# Patient Record
Sex: FEMALE | Race: OTHER | HISPANIC OR LATINO | URBAN - METROPOLITAN AREA
[De-identification: names, ages, dates, MRNs, and addresses within clinical notes are randomized per-mention and may not be internally consistent; named-entity substitution may affect disease eponyms.]

---

## 2022-07-09 ENCOUNTER — TELEPHONE ENCOUNTER (OUTPATIENT)
Dept: URBAN - METROPOLITAN AREA CLINIC 121 | Facility: CLINIC | Age: 42
End: 2022-07-09

## 2022-07-10 ENCOUNTER — TELEPHONE ENCOUNTER (OUTPATIENT)
Dept: URBAN - METROPOLITAN AREA CLINIC 121 | Facility: CLINIC | Age: 42
End: 2022-07-10

## 2022-07-12 ENCOUNTER — OFFICE VISIT (OUTPATIENT)
Dept: URBAN - METROPOLITAN AREA TELEHEALTH 1 | Facility: TELEHEALTH | Age: 42
End: 2022-07-12

## 2022-08-02 ENCOUNTER — OFFICE VISIT (OUTPATIENT)
Dept: URBAN - METROPOLITAN AREA TELEHEALTH 1 | Facility: TELEHEALTH | Age: 42
End: 2022-08-02
Payer: COMMERCIAL

## 2022-08-02 DIAGNOSIS — K29.60 REFLUX GASTRITIS: ICD-10-CM

## 2022-08-02 DIAGNOSIS — R10.13 DYSPEPSIA: ICD-10-CM

## 2022-08-02 PROCEDURE — 99213 OFFICE O/P EST LOW 20 MIN: CPT | Performed by: INTERNAL MEDICINE

## 2022-08-02 RX ORDER — SUCRALFATE 1 G/1
1 TABLET ON AN EMPTY STOMACH TABLET ORAL TWICE A DAY
Qty: 60 | Refills: 0 | OUTPATIENT
Start: 2022-08-02 | End: 2022-09-01

## 2022-08-02 NOTE — HPI-TODAY'S VISIT:
Patient seen today via telehealth by agreement and consent of patient in light of current COVID-19 pandemic. I used video conferencing during the visit. The patient encounter is appropriate and reasonable under the circumstances given the patient's particular presentation at this time. The patient has been advised of the followin) the potential risks and limitations of this mode of treatment (including but not limited to the absence of in-person examination); 2) the right to refuse telehealth services at any point without affecting the right to future care; 3) the right to receive in-person services, included immediately after this consultation if an urgent need arises; 4) information, including identifiable images or information from this telehealth consult, will only be shared in accordance with HIPPA regulations. Any and all of the patient's and/or patient's family member's questions on this issue have been answered. The patient has verbally consented to be treated via telehealth services. The patient has also been advised to contact this office for worsening conditions or problems, and seek emergency medical treatment and/or call 911 if the patient deems either necessary.   More than half of the face-to-face time used for counseling and coordination of care.  The patient received telehealth services at: home   Patient with lower abdominal pain, intermittent, 9/10 intensity, that started 2 years ago, she said had not gynecologic work up done. history of constipation, recently with normal daily  BM. No Hx of CRC.  Will do colonoscopy and abdominal US.   Colonoscopy with evidence of one tubular adenoma, abdominal US with evidence of multiple uterine fibroid. Patient said she is doing well, no more constipation no GI complaints.  :  Patient is seen for a surveillance colonoscopy. Patient last colonoscopy done in  with personal h/o colonic polyp ( adenoma)  Notes 1-2 daily bowel movements without constipation or diarrhea. Denies nausea, vomiting, heartburn, reflux, dysphagia, odynophagia, hematemesis, coffee ground emesis, abdominal pain, change in bowel habits, abnormal weight loss, BRBPR or melena. Denies family history of colon cancer or colon polyps. No other GI complaints at this time. Laz plan surveillance colonoscopy. Patiient  with occasional diarrhea and irritation of the hemorrhoids, Patient had tubal ligation on December of last year. Patient with reflux and bloating. Will plan EGD , patient taking omeprazole daily with no improvement will start sucralfate twice a day.

## 2022-08-09 ENCOUNTER — LAB OUTSIDE AN ENCOUNTER (OUTPATIENT)
Dept: URBAN - METROPOLITAN AREA TELEHEALTH 1 | Facility: TELEHEALTH | Age: 42
End: 2022-08-09

## 2022-09-02 ENCOUNTER — TELEPHONE ENCOUNTER (OUTPATIENT)
Dept: URBAN - METROPOLITAN AREA CLINIC 64 | Facility: CLINIC | Age: 42
End: 2022-09-02

## 2022-09-02 RX ORDER — LIDOCAINE 50 MG/G
1 APPLICATION AS NEEDED CREAM TOPICAL
OUTPATIENT
Start: 2022-09-02

## 2022-09-09 ENCOUNTER — LAB OUTSIDE AN ENCOUNTER (OUTPATIENT)
Dept: URBAN - METROPOLITAN AREA CLINIC 64 | Facility: CLINIC | Age: 42
End: 2022-09-09

## 2022-09-09 ENCOUNTER — OFFICE VISIT (OUTPATIENT)
Dept: URBAN - METROPOLITAN AREA SURGERY CENTER 4 | Facility: SURGERY CENTER | Age: 42
End: 2022-09-09
Payer: COMMERCIAL

## 2022-09-09 ENCOUNTER — CLAIMS CREATED FROM THE CLAIM WINDOW (OUTPATIENT)
Dept: URBAN - METROPOLITAN AREA CLINIC 4 | Facility: CLINIC | Age: 42
End: 2022-09-09
Payer: COMMERCIAL

## 2022-09-09 DIAGNOSIS — K31.89 OTHER DISEASES OF STOMACH AND DUODENUM: ICD-10-CM

## 2022-09-09 DIAGNOSIS — B96.81 HELICOBACTER PYLORI [H. PYLORI] AS THE CAUSE OF DISEASES CLASSIFIED ELSEWHERE: ICD-10-CM

## 2022-09-09 DIAGNOSIS — K29.60 OTHER GASTRITIS WITHOUT BLEEDING: ICD-10-CM

## 2022-09-09 DIAGNOSIS — Z86.010 HX OF ADENOMATOUS COLONIC POLYPS: ICD-10-CM

## 2022-09-09 DIAGNOSIS — K57.30 DIVERTCULOSIS OF LG INT W/O PERFORATION OR ABSCESS W/O BLEEDING: ICD-10-CM

## 2022-09-09 DIAGNOSIS — K21.9 GASTRO-ESOPHAGEAL REFLUX DISEASE WITHOUT ESOPHAGITIS: ICD-10-CM

## 2022-09-09 DIAGNOSIS — R19.8 OTHER SPECIFIED SYMPTOMS AND SIGNS INVOLVING THE DIGESTIVE SYSTEM AND ABDOMEN: ICD-10-CM

## 2022-09-09 DIAGNOSIS — K29.50 UNSPECIFIED CHRONIC GASTRITIS WITHOUT BLEEDING: ICD-10-CM

## 2022-09-09 DIAGNOSIS — K64.1 GRADE II INTERNAL HEMORRHOIDS: ICD-10-CM

## 2022-09-09 PROCEDURE — 88342 IMHCHEM/IMCYTCHM 1ST ANTB: CPT | Performed by: PATHOLOGY

## 2022-09-09 PROCEDURE — G8907 PT DOC NO EVENTS ON DISCHARG: HCPCS | Performed by: INTERNAL MEDICINE

## 2022-09-09 PROCEDURE — G0105 COLORECTAL SCRN; HI RISK IND: HCPCS | Performed by: INTERNAL MEDICINE

## 2022-09-09 PROCEDURE — 88312 SPECIAL STAINS GROUP 1: CPT | Performed by: PATHOLOGY

## 2022-09-09 PROCEDURE — 88305 TISSUE EXAM BY PATHOLOGIST: CPT | Performed by: PATHOLOGY

## 2022-09-09 PROCEDURE — 43239 EGD BIOPSY SINGLE/MULTIPLE: CPT | Performed by: INTERNAL MEDICINE

## 2022-09-09 PROCEDURE — G8918 PT W/O PREOP ORDER IV AB PRO: HCPCS | Performed by: INTERNAL MEDICINE

## 2022-09-09 RX ORDER — LIDOCAINE 50 MG/G
1 APPLICATION AS NEEDED CREAM TOPICAL
Status: ACTIVE | COMMUNITY
Start: 2022-09-02

## 2022-09-15 PROBLEM — 398050005 DIVERTICULAR DISEASE OF COLON: Status: ACTIVE | Noted: 2022-09-15

## 2022-09-15 PROBLEM — 84568007 ATROPHIC GASTRITIS: Status: ACTIVE | Noted: 2022-09-15

## 2022-09-20 ENCOUNTER — TELEPHONE ENCOUNTER (OUTPATIENT)
Dept: URBAN - METROPOLITAN AREA CLINIC 63 | Facility: CLINIC | Age: 42
End: 2022-09-20

## 2022-09-27 ENCOUNTER — OFFICE VISIT (OUTPATIENT)
Dept: URBAN - METROPOLITAN AREA CLINIC 63 | Facility: CLINIC | Age: 42
End: 2022-09-27

## 2022-09-27 ENCOUNTER — TELEPHONE ENCOUNTER (OUTPATIENT)
Dept: URBAN - METROPOLITAN AREA CLINIC 63 | Facility: CLINIC | Age: 42
End: 2022-09-27

## 2022-09-27 RX ORDER — LIDOCAINE 50 MG/G
1 APPLICATION AS NEEDED CREAM TOPICAL
Status: ACTIVE | COMMUNITY
Start: 2022-09-02

## 2022-10-03 ENCOUNTER — OFFICE VISIT (OUTPATIENT)
Dept: URBAN - METROPOLITAN AREA CLINIC 63 | Facility: CLINIC | Age: 42
End: 2022-10-03

## 2022-10-07 ENCOUNTER — TELEPHONE ENCOUNTER (OUTPATIENT)
Dept: URBAN - METROPOLITAN AREA CLINIC 63 | Facility: CLINIC | Age: 42
End: 2022-10-07

## 2022-10-11 ENCOUNTER — OFFICE VISIT (OUTPATIENT)
Dept: URBAN - METROPOLITAN AREA CLINIC 63 | Facility: CLINIC | Age: 42
End: 2022-10-11
Payer: COMMERCIAL

## 2022-10-11 ENCOUNTER — OFFICE VISIT (OUTPATIENT)
Dept: URBAN - METROPOLITAN AREA CLINIC 63 | Facility: CLINIC | Age: 42
End: 2022-10-11

## 2022-10-11 VITALS
DIASTOLIC BLOOD PRESSURE: 100 MMHG | HEIGHT: 67 IN | TEMPERATURE: 97.1 F | HEART RATE: 84 BPM | WEIGHT: 183.8 LBS | SYSTOLIC BLOOD PRESSURE: 160 MMHG | OXYGEN SATURATION: 97 % | BODY MASS INDEX: 28.85 KG/M2

## 2022-10-11 DIAGNOSIS — Z86.010 HX OF ADENOMATOUS COLONIC POLYPS: ICD-10-CM

## 2022-10-11 DIAGNOSIS — A04.8 H. PYLORI INFECTION: ICD-10-CM

## 2022-10-11 DIAGNOSIS — K29.60 REFLUX GASTRITIS: ICD-10-CM

## 2022-10-11 PROBLEM — 57433008: Status: ACTIVE | Noted: 2022-09-06

## 2022-10-11 PROBLEM — 429047008: Status: ACTIVE | Noted: 2022-09-02

## 2022-10-11 PROCEDURE — 99214 OFFICE O/P EST MOD 30 MIN: CPT | Performed by: INTERNAL MEDICINE

## 2022-10-11 RX ORDER — AMOXICILLIN 500 MG/1
2 CAPSULES CAPSULE ORAL TWICE A DAY
Qty: 56 CAPSULE | OUTPATIENT
Start: 2022-10-11 | End: 2022-10-25

## 2022-10-11 RX ORDER — LIDOCAINE 50 MG/G
1 APPLICATION AS NEEDED CREAM TOPICAL
Status: ACTIVE | COMMUNITY
Start: 2022-09-02

## 2022-10-11 RX ORDER — OMEPRAZOLE 20 MG/1
1 CAPSULE 30 MINUTES BEFORE MORNING MEAL CAPSULE, DELAYED RELEASE ORAL TWICE A DAY
Qty: 28 CAPSULE | OUTPATIENT
Start: 2022-10-11

## 2022-10-11 RX ORDER — LIDOCAINE 50 MG/G
1 APPLICATION AS NEEDED CREAM TOPICAL
Status: ON HOLD | COMMUNITY
Start: 2022-09-02

## 2022-10-11 RX ORDER — CLARITHROMYCIN 500 MG/1
1 TABLET TABLET, FILM COATED ORAL
Qty: 28 TABLET | OUTPATIENT
Start: 2022-10-11 | End: 2022-10-25

## 2022-10-11 NOTE — HPI-TODAY'S VISIT:
Patient seen today via telehealth by agreement and consent of patient in light of current COVID-19 pandemic. I used video conferencing during the visit. The patient encounter is appropriate and reasonable under the circumstances given the patient's particular presentation at this time. The patient has been advised of the followin) the potential risks and limitations of this mode of treatment (including but not limited to the absence of in-person examination); 2) the right to refuse telehealth services at any point without affecting the right to future care; 3) the right to receive in-person services, included immediately after this consultation if an urgent need arises; 4) information, including identifiable images or information from this telehealth consult, will only be shared in accordance with HIPPA regulations. Any and all of the patient's and/or patient's family member's questions on this issue have been answered. The patient has verbally consented to be treated via telehealth services. The patient has also been advised to contact this office for worsening conditions or problems, and seek emergency medical treatment and/or call 911 if the patient deems either necessary.   More than half of the face-to-face time used for counseling and coordination of care.  The patient received telehealth services at: home   Patient with lower abdominal pain, intermittent, 9/10 intensity, that started 2 years ago, she said had not gynecologic work up done. history of constipation, recently with normal daily  BM. No Hx of CRC.  Will do colonoscopy and abdominal US.   Colonoscopy with evidence of one tubular adenoma, abdominal US with evidence of multiple uterine fibroid. Patient said she is doing well, no more constipation no GI complaints. :  Patient is seen for a surveillance colonoscopy. Patient last colonoscopy done in  with personal h/o colonic polyp ( adenoma)  Notes 1-2 daily bowel movements without constipation or diarrhea. Denies nausea, vomiting, heartburn, reflux, dysphagia, odynophagia, hematemesis, coffee ground emesis, abdominal pain, change in bowel habits, abnormal weight loss, BRBPR or melena. Denies family history of colon cancer or colon polyps. No other GI complaints at this time. Laz plan surveillance colonoscopy. Patiient  with occasional diarrhea and irritation of the hemorrhoids, Patient had tubal ligation on December of last year. Patient with reflux and bloating. Will plan EGD , patient taking omeprazole daily with no improvement will start sucralfate twice a day. 10/22: Patient is seen in the office after having an upper endoscopy and colonoscopy done on 2022.  Upper endoscopy showed normal esophagus with chronic gastritis and normal duodenum colonoscopy with mild diverticulosis in the sigmoid colon and descending colon and internal hemorrhoids because of her personal history issues she will have her follow-up colonoscopy in 5 years.  Pathology report from upper endoscopy showed that the patient had chronic gastritis positive for H. pylori gastritis organism, no evidence of intestinal metaplasia, normal duodenum mild changes suggestive of reflux in the distal esophagus.  With this finding patient she will need to be on treatment, will do triple treatment for H. pylori we will follow in 2 months.

## 2022-12-06 ENCOUNTER — OFFICE VISIT (OUTPATIENT)
Dept: URBAN - METROPOLITAN AREA TELEHEALTH 1 | Facility: TELEHEALTH | Age: 42
End: 2022-12-06
Payer: COMMERCIAL

## 2022-12-06 ENCOUNTER — DASHBOARD ENCOUNTERS (OUTPATIENT)
Age: 42
End: 2022-12-06

## 2022-12-06 DIAGNOSIS — A04.8 H. PYLORI INFECTION: ICD-10-CM

## 2022-12-06 PROBLEM — 307759003 HELICOBACTER PYLORI GASTROINTESTINAL TRACT INFECTION: Status: ACTIVE | Noted: 2022-10-11

## 2022-12-06 PROCEDURE — 99213 OFFICE O/P EST LOW 20 MIN: CPT | Performed by: INTERNAL MEDICINE

## 2022-12-06 RX ORDER — LIDOCAINE 50 MG/G
1 APPLICATION AS NEEDED CREAM TOPICAL
Status: ACTIVE | COMMUNITY
Start: 2022-09-02

## 2022-12-06 RX ORDER — OMEPRAZOLE 20 MG/1
1 CAPSULE 30 MINUTES BEFORE MORNING MEAL CAPSULE, DELAYED RELEASE ORAL TWICE A DAY
Qty: 28 CAPSULE | Status: ACTIVE | COMMUNITY
Start: 2022-10-11

## 2022-12-06 NOTE — HPI-TODAY'S VISIT:
Patient seen today via telehealth by agreement and consent of patient in light of current COVID-19 pandemic. I used video conferencing during the visit. The patient encounter is appropriate and reasonable under the circumstances given the patient's particular presentation at this time. The patient has been advised of the followin) the potential risks and limitations of this mode of treatment (including but not limited to the absence of in-person examination); 2) the right to refuse telehealth services at any point without affecting the right to future care; 3) the right to receive in-person services, included immediately after this consultation if an urgent need arises; 4) information, including identifiable images or information from this telehealth consult, will only be shared in accordance with HIPPA regulations. Any and all of the patient's and/or patient's family member's questions on this issue have been answered. The patient has verbally consented to be treated via telehealth services. The patient has also been advised to contact this office for worsening conditions or problems, and seek emergency medical treatment and/or call 911 if the patient deems either necessary.   More than half of the face-to-face time used for counseling and coordination of care.  The patient received telehealth services at: home   Patient with lower abdominal pain, intermittent, 9/10 intensity, that started 2 years ago, she said had not gynecologic work up done. history of constipation, recently with normal daily  BM. No Hx of CRC.  Will do colonoscopy and abdominal US.   Colonoscopy with evidence of one tubular adenoma, abdominal US with evidence of multiple uterine fibroid. Patient said she is doing well, no more constipation no GI complaints. :  Patient is seen for a surveillance colonoscopy. Patient last colonoscopy done in  with personal h/o colonic polyp ( adenoma)  Notes 1-2 daily bowel movements without constipation or diarrhea. Denies nausea, vomiting, heartburn, reflux, dysphagia, odynophagia, hematemesis, coffee ground emesis, abdominal pain, change in bowel habits, abnormal weight loss, BRBPR or melena. Denies family history of colon cancer or colon polyps. No other GI complaints at this time. Laz plan surveillance colonoscopy. Patiient  with occasional diarrhea and irritation of the hemorrhoids, Patient had tubal ligation on December of last year. Patient with reflux and bloating. Will plan EGD , patient taking omeprazole daily with no improvement will start sucralfate twice a day. 10/22: Patient is seen in the office after having an upper endoscopy and colonoscopy done on 2022.  Upper endoscopy showed normal esophagus with chronic gastritis and normal duodenum colonoscopy with mild diverticulosis in the sigmoid colon and descending colon and internal hemorrhoids because of her personal history issues she will have her follow-up colonoscopy in 5 years.  Pathology report from upper endoscopy showed that the patient had chronic gastritis positive for H. pylori gastritis organism, no evidence of intestinal metaplasia, normal duodenum mild changes suggestive of reflux in the distal esophagus.  With this finding patient she will need to be on treatment, will do triple treatment for H. pylori we will follow in 2 months. Patient seen today via telehealth by agreement and consent of patient in light of current COVID-19 pandemic. I used video conferencing during the visit. The patient encounter is appropriate and reasonable under the circumstances given the patient's particular presentation at this time. The patient has been advised of the followin) the potential risks and limitations of this mode of treatment (including but not limited to the absence of in-person examination); 2) the right to refuse telehealth services at any point without affecting the right to future care; 3) the right to receive in-person services, included immediately after this consultation if an urgent need arises; 4) information, including identifiable images or information from this telehealth consult, will only be shared in accordance with HIPPA regulations. Any and all of the patient's and/or patient's family member's questions on this issue have been answered. The patient has verbally consented to be treated via telehealth services. The patient has also been advised to contact this office for worsening conditions or problems, and seek emergency medical treatment and/or call 911 if the patient deems either necessary.   More than half of the face-to-face time used for counseling and coordination of care.  The patient received telehealth services at: home  : Patient with H pylori with s/p tretment   patient only with mild improvement, will do check H pylori  status. patient with bloating, will follow closely, further treatment depending on her clinical course and urea breath test result. Will follow closely.

## 2022-12-12 ENCOUNTER — OFFICE VISIT (OUTPATIENT)
Dept: URBAN - METROPOLITAN AREA CLINIC 63 | Facility: CLINIC | Age: 42
End: 2022-12-12